# Patient Record
Sex: FEMALE | Race: WHITE | NOT HISPANIC OR LATINO
[De-identification: names, ages, dates, MRNs, and addresses within clinical notes are randomized per-mention and may not be internally consistent; named-entity substitution may affect disease eponyms.]

---

## 2024-04-15 ENCOUNTER — APPOINTMENT (OUTPATIENT)
Dept: ORTHOPEDIC SURGERY | Facility: CLINIC | Age: 84
End: 2024-04-15
Payer: MEDICARE

## 2024-04-15 VITALS — BODY MASS INDEX: 22.6 KG/M2 | HEIGHT: 67 IN | WEIGHT: 144 LBS

## 2024-04-15 DIAGNOSIS — K21.9 GASTRO-ESOPHAGEAL REFLUX DISEASE W/OUT ESOPHAGITIS: ICD-10-CM

## 2024-04-15 DIAGNOSIS — S02.85XA FX OF ORBIT, UNSP, INT ENC FOR CL: ICD-10-CM

## 2024-04-15 DIAGNOSIS — Z85.828 PERSONAL HISTORY OF OTHER MALIGNANT NEOPLASM OF SKIN: ICD-10-CM

## 2024-04-15 DIAGNOSIS — E78.00 PURE HYPERCHOLESTEROLEMIA, UNSPECIFIED: ICD-10-CM

## 2024-04-15 DIAGNOSIS — I10 ESSENTIAL (PRIMARY) HYPERTENSION: ICD-10-CM

## 2024-04-15 DIAGNOSIS — S09.90XA UNSPECIFIED INJURY OF HEAD, INITIAL ENCOUNTER: ICD-10-CM

## 2024-04-15 DIAGNOSIS — S42.309A UNSPECIFIED FRACTURE OF SHAFT OF HUMERUS, UNSPECIFIED ARM, INITIAL ENCOUNTER FOR CLOSED FRACTURE: ICD-10-CM

## 2024-04-15 DIAGNOSIS — L30.9 DERMATITIS, UNSPECIFIED: ICD-10-CM

## 2024-04-15 DIAGNOSIS — K44.9 DIAPHRAGMATIC HERNIA W/OUT OBSTRUCTION OR GANGRENE: ICD-10-CM

## 2024-04-15 DIAGNOSIS — H35.30 UNSPECIFIED MACULAR DEGENERATION: ICD-10-CM

## 2024-04-15 PROBLEM — Z00.00 ENCOUNTER FOR PREVENTIVE HEALTH EXAMINATION: Status: ACTIVE | Noted: 2024-04-15

## 2024-04-15 PROCEDURE — 23600 CLTX PROX HUMRL FX W/O MNPJ: CPT | Mod: RT

## 2024-04-15 PROCEDURE — 99205 OFFICE O/P NEW HI 60 MIN: CPT

## 2024-04-15 RX ORDER — PANTOPRAZOLE SODIUM 40 MG/1
40 GRANULE, DELAYED RELEASE ORAL
Refills: 0 | Status: ACTIVE | COMMUNITY

## 2024-04-15 RX ORDER — OXYCODONE 5 MG/1
5 TABLET ORAL
Refills: 0 | Status: ACTIVE | COMMUNITY

## 2024-04-15 RX ORDER — BENAZEPRIL HYDROCHLORIDE 20 MG/1
20 TABLET, FILM COATED ORAL
Refills: 0 | Status: ACTIVE | COMMUNITY

## 2024-04-15 RX ORDER — EZETIMIBE AND SIMVASTATIN 10; 40 MG/1; MG/1
10-40 TABLET ORAL
Refills: 0 | Status: ACTIVE | COMMUNITY

## 2024-04-15 RX ORDER — HYDROCORTISONE 25 MG/G
2.5 CREAM TOPICAL
Refills: 0 | Status: ACTIVE | COMMUNITY

## 2024-04-15 RX ORDER — AMOXICILLIN AND CLAVULANATE POTASSIUM 875; 125 MG/1; MG/1
875-125 TABLET, COATED ORAL
Refills: 0 | Status: ACTIVE | COMMUNITY

## 2024-04-15 RX ORDER — CHROMIUM 200 MCG
1000 TABLET ORAL
Refills: 0 | Status: ACTIVE | COMMUNITY

## 2024-04-15 RX ORDER — HYDROCHLOROTHIAZIDE 12.5 MG/1
12.5 CAPSULE ORAL
Refills: 0 | Status: ACTIVE | COMMUNITY

## 2024-04-19 ENCOUNTER — APPOINTMENT (OUTPATIENT)
Dept: ORTHOPEDIC SURGERY | Facility: CLINIC | Age: 84
End: 2024-04-19
Payer: MEDICARE

## 2024-04-19 DIAGNOSIS — S42.201A UNSPECIFIED FRACTURE OF UPPER END OF RIGHT HUMERUS, INITIAL ENCOUNTER FOR CLOSED FRACTURE: ICD-10-CM

## 2024-04-19 PROCEDURE — 99214 OFFICE O/P EST MOD 30 MIN: CPT

## 2024-04-19 RX ORDER — VIT C/E/ZN/COPPR/LUTEIN/ZEAXAN 250MG-90MG
CAPSULE ORAL
Refills: 0 | Status: ACTIVE | COMMUNITY

## 2024-04-19 RX ORDER — HYDROCHLOROTHIAZIDE 12.5 MG/1
12.5 TABLET ORAL
Qty: 90 | Refills: 0 | Status: ACTIVE | COMMUNITY
Start: 2024-01-29

## 2024-04-19 RX ORDER — NIRMATRELVIR AND RITONAVIR 300-100 MG
20 X 150 MG & KIT ORAL
Qty: 30 | Refills: 0 | Status: ACTIVE | COMMUNITY
Start: 2024-02-10

## 2024-04-19 RX ORDER — PANTOPRAZOLE 40 MG/1
40 TABLET, DELAYED RELEASE ORAL
Qty: 90 | Refills: 0 | Status: ACTIVE | COMMUNITY
Start: 2024-02-21

## 2024-04-19 NOTE — HISTORY OF PRESENT ILLNESS
[de-identified] : Date of initial evaluation 04/19/2024 Patient age is 83 year Occupation is retired Body part causing symptoms is the RT shoulder  Symptoms began 04/13/24, she fell onto the right shoulder Seen in [facility], placed in sling She was seen by Dr. Barnett on 4/15 who recommended RSA as soon as possible She states that she is seeking another opinion She reports numbness in her hand and difficulty moving her fingers Location of pain is lateral  Quality of pain is aching  Pain score at rest is 5/10 Pain score during activity is 8/10 Prior treatments include sling and tylenol Patient's condition is not associated with workers compensation, no-fault or interscholastic athletics

## 2024-04-19 NOTE — IMAGING
[de-identified] : (Exam: Shoulder)   Laterality is right    Patient is in no acute distress, alert and oriented Sensation is intact but diminished globally in the hand Motor function is  3/5 EPL, FPL Io Capillary refill is less than 2 seconds in the fingers Radial pulse is 2+ Lymphadenopathy is not present Peripheral edema is not present   Skin is intact Ecchymosis is present Swelling is present Atrophy is not present Scapular winging is not present Deformity of the AC joint is not present Deformity of the biceps is not present   Bicipital groove tenderness is present AC joint tenderness is not present Scapulothoracic tenderness is not present   Active forward elevation is 20 Passive forward elevation is 40 External rotation at the side is 0 Internal rotation behind the back is to the level of side pocket   Forward elevation strength is 0/5 External rotation strength at the side is 1/5 Internal rotation strength at the side is 1/5 Deltoid anterior, posterior and lateral heads are firing    [Right] : right shoulder [Outside films reviewed] : Outside films reviewed [FreeTextEntry1] : ER films show 4 part proximal humerus fracture dislocation

## 2024-04-19 NOTE — DISCUSSION/SUMMARY
[de-identified] : History, clinical examination and imaging were most consistent with: -right shoulder closed subacute proximal humerus fracture dislocation -right brachial plexopathy  The diagnosis was explained in detail. The potential non-surgical and surgical treatments were reviewed. The relative risks and benefits of each option were considered relative to the patients age, activity level, medical history, symptom severity and previously attempted treatments.   The patient was advised to consult with their primary medical provider prior to initiation of any new medications to reduce the risk of adverse effects specific to their long-term home medications and medical history. The risk of gastrointestinal irritation and kidney injury specific to long-term NSAID use was discussed.   -Discussed the complex nature of her problem.  -We discussed that with non-surgical treatment she likely will have permanently limited function of her arm. Discussed that the only predictable surgical treatment for this problem is reverse total shoulder arthroplasty. We discussed that due to the medialization of the humeral head fragment and associated brachial plexopathy, that she is at high risk of axillary artery injury. Discussed that if surgery is selected, that CT angiogram should be performed pre-operatively and that a vascular surgeon should be on standby. -Patient will think about her options and is considering an additional opinion. I recommended a tertiary care center due to the complex nature of her case. -Continue sling. -Tylenol as needed for pain. -Recommend she continue to move hand, wrist and fingers to encourage nerve recovery. Discussed she may have permanent nerve damage from this injury, but that observation is recommended at this time. Consider EMG if symptoms persist. -Follow up with me as needed.  ([facility])   Problem Complexity -Moderate: acute, complicated injury  Risk -Low: over the counter medication   -More than 45 minutes was spent during this visit reviewing patient data and discussing treatment options.

## 2024-04-19 NOTE — DATA REVIEWED
[CT Scan] : CT scan [Right] : of the right [I independently reviewed and interpreted images and report] : I independently reviewed and interpreted images and report [Shoulder] : shoulder [FreeTextEntry1] : ER study shows comminuted 4 part proximal humerus fracture, with humeral head fragment dislocated anterior to the glenoid, medial to the coracoid

## 2024-04-22 PROBLEM — S09.90XA HEAD INJURY: Status: ACTIVE | Noted: 2024-04-15

## 2024-04-22 PROBLEM — K44.9 HIATAL HERNIA: Status: ACTIVE | Noted: 2024-04-15

## 2024-04-22 PROBLEM — E78.00 HIGH BLOOD CHOLESTEROL: Status: ACTIVE | Noted: 2024-04-15

## 2024-04-22 PROBLEM — S02.85XA ORBITAL FRACTURE: Status: ACTIVE | Noted: 2024-04-15

## 2024-04-22 PROBLEM — I10 BP (HIGH BLOOD PRESSURE): Status: ACTIVE | Noted: 2024-04-15

## 2024-04-22 PROBLEM — L30.9 ECZEMA: Status: ACTIVE | Noted: 2024-04-15

## 2024-04-22 PROBLEM — K21.9 ACID REFLUX: Status: ACTIVE | Noted: 2024-04-15

## 2024-04-22 PROBLEM — H35.30 MACULAR DEGENERATION: Status: ACTIVE | Noted: 2024-04-15

## 2024-04-22 NOTE — IMAGING
[Right] : right shoulder Lives with mother, father, 3 older siblings. [Outside films reviewed] : Outside films reviewed [No loss of surgical correlation. Bony alignment acceptable. Hardware in appropriate position] : No loss of surgical correlation. Bony alignment acceptable. Hardware in appropriate position [Dislocation] : Dislocation [de-identified] : No erythema, no discharge, no increased warmth to touch. ROM deferred due to fx status, strength testing deferred due to fx status. Distally neurovascularly intact with median, radial, ulnar, MSC, axillary nerves intact. 2+ radial pulse with capillary refill >2 seconds   X-rays of the RIGHT shoulder shows 4-part comminuted fracture of proximal humerus  Able to move wrist. Unable to oppose thumb to small finger and abduct fingers. Sensation intact in median, superficial radial. <2sec cap refill. Ulnar nerve injury

## 2024-04-22 NOTE — HISTORY OF PRESENT ILLNESS
[de-identified] : The patient is a 83 year old RHD Female who presents today complaining of right shoulder pain.  Date of Injury/Onset: 4/13/2024 Pain:    At Rest: 6/10 With Activity:  8.5/10 Mechanism of injury: Patient was at the gas station and patient loss balance on an incline and fell on concrete.  Quality of symptoms: achy and radiates down to fingers that go numb. Improves with: Oxycodone  Worse with: pressure and laying down  Prior treatment/ Imaging: X-ray & CAT scan @ [facility] [employer]/Position/Occupation: retired Additional Information: [None]

## 2024-04-22 NOTE — DATA REVIEWED
[CT Scan] : CT scan [Right] : of the right [Shoulder] : shoulder [Report was reviewed and noted in the chart] : The report was reviewed and noted in the chart [I independently reviewed and interpreted images and report] : I independently reviewed and interpreted images and report [I reviewed the films/CD] : I reviewed the films/CD [FreeTextEntry1] : Anteroinferior dislocation and fracture of the right shoulder described.